# Patient Record
Sex: FEMALE | Race: WHITE
[De-identification: names, ages, dates, MRNs, and addresses within clinical notes are randomized per-mention and may not be internally consistent; named-entity substitution may affect disease eponyms.]

---

## 2017-06-19 ENCOUNTER — HOSPITAL ENCOUNTER (EMERGENCY)
Dept: HOSPITAL 83 - ED | Age: 82
Discharge: HOME | End: 2017-06-19
Payer: MEDICARE

## 2017-06-19 VITALS — HEIGHT: 61.97 IN | WEIGHT: 160 LBS | BODY MASS INDEX: 29.44 KG/M2

## 2017-06-19 DIAGNOSIS — M19.90: ICD-10-CM

## 2017-06-19 DIAGNOSIS — Z88.0: ICD-10-CM

## 2017-06-19 DIAGNOSIS — M19.041: Primary | ICD-10-CM

## 2017-06-19 DIAGNOSIS — Z90.49: ICD-10-CM

## 2022-05-02 DIAGNOSIS — I48.91 ATRIAL FIBRILLATION, UNSPECIFIED TYPE (HCC): ICD-10-CM

## 2022-05-02 PROBLEM — J44.9 COPD (CHRONIC OBSTRUCTIVE PULMONARY DISEASE) (HCC): Status: ACTIVE | Noted: 2022-05-02

## 2022-05-02 RX ORDER — POTASSIUM CHLORIDE 1.5 G/1.77G
20 POWDER, FOR SOLUTION ORAL DAILY
COMMUNITY
End: 2022-05-03

## 2022-05-03 ENCOUNTER — OFFICE VISIT (OUTPATIENT)
Dept: CARDIOLOGY CLINIC | Age: 87
End: 2022-05-03
Payer: MEDICARE

## 2022-05-03 VITALS
DIASTOLIC BLOOD PRESSURE: 68 MMHG | SYSTOLIC BLOOD PRESSURE: 124 MMHG | HEIGHT: 62 IN | HEART RATE: 76 BPM | WEIGHT: 121 LBS | BODY MASS INDEX: 22.26 KG/M2 | RESPIRATION RATE: 16 BRPM

## 2022-05-03 DIAGNOSIS — E78.5 DYSLIPIDEMIA: ICD-10-CM

## 2022-05-03 DIAGNOSIS — I50.22 CHRONIC SYSTOLIC CONGESTIVE HEART FAILURE (HCC): ICD-10-CM
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
## 2022-05-03 DIAGNOSIS — K21.00 GASTROESOPHAGEAL REFLUX DISEASE WITH ESOPHAGITIS WITHOUT HEMORRHAGE: ICD-10-CM

## 2022-05-03 DIAGNOSIS — I10 ESSENTIAL HYPERTENSION: ICD-10-CM

## 2022-05-03 DIAGNOSIS — J42 CHRONIC BRONCHITIS, UNSPECIFIED CHRONIC BRONCHITIS TYPE (HCC): ICD-10-CM

## 2022-05-03 DIAGNOSIS — I48.91 ATRIAL FIBRILLATION, UNSPECIFIED TYPE (HCC): Primary | ICD-10-CM

## 2022-05-03 PROCEDURE — 99214 OFFICE O/P EST MOD 30 MIN: CPT | Performed by: INTERNAL MEDICINE

## 2022-05-03 PROCEDURE — G8420 CALC BMI NORM PARAMETERS: HCPCS | Performed by: INTERNAL MEDICINE

## 2022-05-03 PROCEDURE — 93000 ELECTROCARDIOGRAM COMPLETE: CPT | Performed by: INTERNAL MEDICINE

## 2022-05-03 PROCEDURE — 4040F PNEUMOC VAC/ADMIN/RCVD: CPT | Performed by: INTERNAL MEDICINE

## 2022-05-03 PROCEDURE — 1123F ACP DISCUSS/DSCN MKR DOCD: CPT | Performed by: INTERNAL MEDICINE

## 2022-05-03 PROCEDURE — 3023F SPIROM DOC REV: CPT | Performed by: INTERNAL MEDICINE

## 2022-05-03 PROCEDURE — 1036F TOBACCO NON-USER: CPT | Performed by: INTERNAL MEDICINE

## 2022-05-03 PROCEDURE — 1090F PRES/ABSN URINE INCON ASSESS: CPT | Performed by: INTERNAL MEDICINE

## 2022-05-03 PROCEDURE — G8427 DOCREV CUR MEDS BY ELIG CLIN: HCPCS | Performed by: INTERNAL MEDICINE

## 2022-05-03 RX ORDER — ACETAMINOPHEN 325 MG/1
TABLET ORAL
COMMUNITY
Start: 2022-02-25

## 2022-05-03 RX ORDER — OMEPRAZOLE 40 MG/1
CAPSULE, DELAYED RELEASE ORAL
COMMUNITY
Start: 2022-03-10

## 2022-05-03 RX ORDER — METOPROLOL SUCCINATE 25 MG/1
25 TABLET, EXTENDED RELEASE ORAL DAILY
COMMUNITY

## 2022-05-03 RX ORDER — WITCH HAZEL 86 G/100ML
LIQUID TOPICAL
COMMUNITY
Start: 2022-01-25

## 2022-05-03 RX ORDER — RIVAROXABAN 15 MG/1
TABLET, FILM COATED ORAL
COMMUNITY
Start: 2022-04-22

## 2022-05-03 RX ORDER — DOCUSATE SODIUM 100 MG
CAPSULE ORAL
COMMUNITY
Start: 2022-03-10

## 2022-05-03 RX ORDER — HYDROCORTISONE ACETATE 25 MG
SUPPOSITORY, RECTAL RECTAL
COMMUNITY
Start: 2022-01-26

## 2022-05-03 RX ORDER — CHOLECALCIFEROL (VITAMIN D3) 125 MCG
CAPSULE ORAL
COMMUNITY
Start: 2022-03-10

## 2022-05-03 RX ORDER — GUAIFENESIN AND DEXTROMETHORPHAN HYDROBROMIDE 100; 10 MG/5ML; MG/5ML
5 SOLUTION ORAL EVERY 4 HOURS PRN
COMMUNITY

## 2022-05-03 RX ORDER — ALBUTEROL SULFATE 2.5 MG/3ML
2.5 SOLUTION RESPIRATORY (INHALATION) EVERY 6 HOURS PRN
COMMUNITY

## 2022-05-03 RX ORDER — CITALOPRAM 20 MG/1
TABLET ORAL
COMMUNITY
Start: 2022-03-10

## 2022-05-03 RX ORDER — POTASSIUM CHLORIDE 20 MEQ/1
TABLET, EXTENDED RELEASE ORAL
COMMUNITY
Start: 2022-03-10

## 2022-05-03 RX ORDER — CLONAZEPAM 0.5 MG/1
TABLET ORAL
COMMUNITY
Start: 2022-02-23

## 2022-05-03 RX ORDER — POLYVINYL ALCOHOL 14 MG/ML
1 SOLUTION/ DROPS OPHTHALMIC PRN
COMMUNITY

## 2022-05-03 NOTE — PROGRESS NOTES
Out Patient CARDIOLOGY FOLLOW UP    Name: Tete Don    Age: 80 y.o. Date of Service: 5/4/2022      Referring Physician: No admitting provider for patient encounter. Chief Complaint   Patient presents with    Follow-Up from Hospital     Cardiac check up, no cardiac complaints. History of Present Illness: 51-year-old female with history of atrial fibrillation, COPD, hypertension and GERD and memory difficulties who was hospitalized around March 23, 2022 for A. fib with RVR she was treated with Cardizem drip and discharged on Xarelto and beta-blocker. He underwent transthoracic echocardiogram on March 21, 2022 which revealed EF of 65 to 70% and moderate tricuspid regurgitation and mild mitral regurgitation. She presented for follow-up visit today from a nursing home with a caregiver and patient denies any symptoms. She will follow-up in 3 months. Patient had extensive fear of falling during the visit and requested the caregiver to take her back to the nursing home so that she does not fall. If patient develop fall or recurrent falls then we will have to reassess anticoagulation and perhaps discuss watchman device. Review of Systems:   Cardiac: As per HPI  General: Denies fever or chills  Pulmonary: As per HPI  HEENT: Denies runny nose  GI: No complaints  : No complaints  Endocrine: Denies night sweats  Musculoskeletal: No complaints  Skin: Dry skin  Neuro: No complaints  Psych: Denies depression    Past Medical History:  Past Medical History:   Diagnosis Date    Atrial fibrillation ([address]     COPD (chronic obstructive pulmonary disease) ([facility] [address]        Past Surgical History:  History reviewed. No pertinent surgical history.     Family History:  Family History   Problem Relation Age of Onset    Heart Disease Father        Social History:  Social History     Socioeconomic History    Marital status:      Spouse name: Not on file    Number of children: Not on file    Years of education: Not on file    Highest education level: Not on file   Occupational History    Not on file   Tobacco Use    Smoking status: Never Smoker    Smokeless tobacco: Never Used   Vaping Use    Vaping Use: Never used   Substance and Sexual Activity    Alcohol use: Not Currently    Drug use: Never    Sexual activity: Not on file   Other Topics Concern    Not on file   Social History Narrative    Not on file     Social Determinants of Health     Financial Resource Strain:     Difficulty of Paying Living Expenses: Not on file   Food Insecurity:     Worried About Running Out of Food in the Last Year: Not on file    Ivan of Food in the Last Year: Not on file   Transportation Needs:     Lack of Transportation (Medical): Not on file    Lack of Transportation (Non-Medical): Not on file   Physical Activity:     Days of Exercise per Week: Not on file    Minutes of Exercise per Session: Not on file   Stress:     Feeling of Stress : Not on file   Social Connections:     Frequency of Communication with Friends and Family: Not on file    Frequency of Social Gatherings with Friends and Family: Not on file    Attends [employer] Services: Not on file    Active Member of [address] [place] [address] or Organizations: Not on file    Attends Club or Organization Meetings: Not on file    Marital Status: Not on file   Intimate Partner Violence:     Fear of Current or Ex-Partner: Not on file    Emotionally Abused: Not on file    Physically Abused: Not on file    Sexually Abused: Not on file   Housing Stability:     Unable to Pay for Housing in the Last Year: Not on file    Number of [place] in the Last Year: Not on file    Unstable Housing in the Last Year: Not on file       Allergies: Allergies   Allergen Reactions    Pcn [Penicillins]        Home Medications:  Prior to Admission medications    Medication Sig Start Date End Date Taking?  Authorizing Provider   acetaminophen (TYLENOL) 325 MG tablet TAKE TWO TABLETS BY MOUTH TWICE DAILY EVERY TWELVE HOURS 2/25/22   Historical Provider, MD   citalopram (CELEXA) 20 MG tablet TAKE ONE TABLET BY MOUTH EVERY DAY 3/10/22   Historical Provider, MD   omeprazole (PRILOSEC) 40 MG delayed release capsule TAKE ONE CAPSULE BY MOUTH DAILY 3/10/22   Historical Provider, MD   Cholecalciferol (VITAMIN D3) 50 MCG (2000 UT) TABS TAKE ONE TABLET BY MOUTH EVERY DAY 3/10/22   Historical Provider, MD   XARELTO 15 MG TABS tablet TAKE ONE TABLET BY MOUTH EVERY DAY IN THE MORNING 4/22/22   Historical Provider, MD   potassium chloride (KLOR-CON M) 20 MEQ extended release tablet TAKE ONE TABLET BY MOUTH EVERY DAY 3/10/22   Historical Provider, MD   ANUCORT-HC 25 MG suppository unwrap and insert 1 suppository rectally twice daily for 5 days 1/26/22   Historical Provider, MD LOIS Carrillo [facility] NETWOR PADS use on hemorrhoids twice daily for 5 days 1/25/22   Historical Provider, MD   STOOL SOFTENER 100 MG capsule TAKE ONE CAPSULE BY MOUTH TWICE DAILY 3/10/22   Historical Provider, MD   clonazePAM (KLONOPIN) 0.5 MG tablet take 1/2 tablet by mouth twice daily 2/23/22   Historical Provider, MD   albuterol (PROVENTIL) (2.5 MG/3ML) 0.083% nebulizer solution Take 2.5 mg by nebulization every 6 hours as needed for Wheezing    Historical Provider, MD   metoprolol succinate (TOPROL XL) 25 MG extended release tablet Take 25 mg by mouth daily    Historical Provider, MD   polyvinyl alcohol (LIQUIFILM TEARS) 1.4 % ophthalmic solution 1 drop as needed    Historical Provider, MD   Dextromethorphan-guaiFENesin (ROBITUSSIN PEAK COLD DM)  MG/5ML SYRP Take 5 mLs by mouth every 4 hours as needed for Cough Indications: PRN Q4 HOURS FOR COUGHT    Historical Provider, MD       Current Medications:  Current Outpatient Medications   Medication Sig Dispense Refill    acetaminophen (TYLENOL) 325 MG tablet TAKE TWO TABLETS BY MOUTH TWICE DAILY EVERY TWELVE HOURS      citalopram (CELEXA) 20 MG tablet TAKE ONE TABLET BY MOUTH EVERY DAY      omeprazole (PRILOSEC) 40 MG delayed release capsule TAKE ONE CAPSULE BY MOUTH DAILY      Cholecalciferol (VITAMIN D3) 50 MCG (2000 UT) TABS TAKE ONE TABLET BY MOUTH EVERY DAY      XARELTO 15 MG TABS tablet TAKE ONE TABLET BY MOUTH EVERY DAY IN THE MORNING      potassium chloride (KLOR-CON M) 20 MEQ extended release tablet TAKE ONE TABLET BY MOUTH EVERY DAY      ANUCORT-HC 25 MG suppository unwrap and insert 1 suppository rectally twice daily for 5 days      LOIS Andrade use on hemorrhoids twice daily for 5 days      STOOL SOFTENER 100 MG capsule TAKE ONE CAPSULE BY MOUTH TWICE DAILY      clonazePAM (KLONOPIN) 0.5 MG tablet take 1/2 tablet by mouth twice daily      albuterol (PROVENTIL) (2.5 MG/3ML) 0.083% nebulizer solution Take 2.5 mg by nebulization every 6 hours as needed for Wheezing      metoprolol succinate (TOPROL XL) 25 MG extended release tablet Take 25 mg by mouth daily      polyvinyl alcohol (LIQUIFILM TEARS) 1.4 % ophthalmic solution 1 drop as needed      Dextromethorphan-guaiFENesin (ROBITUSSIN PEAK COLD DM)  MG/5ML SYRP Take 5 mLs by mouth every 4 hours as needed for Cough Indications: PRN Q4 HOURS FOR COUGHT       No current facility-administered medications for this visit. Physical Exam:  /68   Pulse 76   Resp 16   Ht 5' 2\" (1.575 m)   Wt 121 lb (54.9 kg)   BMI 22.13 kg/m²   Wt Readings from Last 3 Encounters:   05/03/22 121 lb (54.9 kg)       Appearance: Alert and oriented x3 not in acute distress.   Skin: Dry skin  Head: Atraumatic  Eyes: Intact extraocular muscles   ENMT: Mucous membranes are moist  Neck: Supple  Lungs: Clear to auscultation  Cardiac: Normal S1 and S2  Abdomen: Protuberant  Extremities: Intact range of motion  Neurologic: No focal neurological deficits  Peripheral Pulses: 2+ peripheral pulses    Intake/Output:  No intake or output data in the 24 hours ending 05/04/22 1546  [unfilled]    Laboratory Tests:  No results for input(s): NA, K, CL, CO2, BUN, CREATININE, GLUCOSE, CALCIUM in the last 72 hours. No results found for: MG  No results for input(s): ALKPHOS, ALT, AST, PROT, BILITOT, BILIDIR, LABALBU in the last 72 hours. No results for input(s): WBC, RBC, HGB, HCT, MCV, MCH, MCHC, RDW, PLT, MPV in the last 72 hours. No results found for: CKTOTAL, CKMB, CKMBINDEX, TROPONINI  No results for input(s): CKTOTAL, CKMB, CKMBINDEX, TROPHS in the last 72 hours. No results found for: INR, PROTIME  No results found for: TSHFT4, TSH  No results found for: LABA1C  No results found for: EAG  No results found for: CHOL  No results found for: TRIG  No results found for: HDL  No results found for: LDLCALC, LDLCHOLESTEROL  No results found for: LABVLDL, VLDL  No results found for: CHOLHDLRATIO  No results for input(s): PROBNP in the last 72 hours. Cardiac Tests:  EKG reviewed (EKG date: Sinus rhythm 76 bpm nonspecific ST-T wave changes):        Echocardiogram reviewed: Cardiogram in March 21, 2022 revealed EF of 65 to 70%, mild concentric left ventricular hypertrophy, normal right ventricular systolic function and size, severely dilated left atrium, mild mitral regurgitation, moderate tract regurgitation and right ventricular systolic pressure of 37 mmHg    Stress test reviewed:      Cardiac catheterization reviewed:     CXR reviewed: The ASCVD Risk score (Gila Pichardo et al., 2013) failed to calculate for the following reasons: The 2013 ASCVD risk score is only valid for ages 36 to 78    ASSESSMENT / PLAN:    1. Atrial fibrillation, unspecified type ([facility] [address]  Patient in sinus rhythm today at a rate of 76 bpm  Denies any symptoms  Continue on beta-blocker and Xarelto for anticoagulation    2. Chronic bronchitis, unspecified chronic bronchitis type ([facility] [address]  Follow up with your PCP  3. Essential hypertension  Blood pressure is stable  4.  Chronic systolic congestive heart failure (HCC)  Denies any symptoms and has no orthopnea or lower extremity edema  5. Dyslipidemia  Low-fat diet  6. Gastroesophageal reflux disease with esophagitis without hemorrhage  Follow-up with your PCP    7. Fear of falling  Patient had extensive fear of falling during the visit and requested the caregiver to take her back to the nursing home so that she does not fall  If patient develop fall or recurrent falls then we will have to reassess anticoagulation and perhaps discuss watchman device. FOLLOW-UP in 3 months      Thank you for allowing me to participate in your patient's care. Please feel free to contact me if you have any questions or concerns.     Dexter Roblero MD  [facility]) Cardiology

## 2022-05-04 PROBLEM — I10 ESSENTIAL HYPERTENSION: Status: ACTIVE | Noted: 2022-05-04

## 2022-05-04 PROBLEM — I50.22 CHRONIC SYSTOLIC CONGESTIVE HEART FAILURE (HCC): Status: ACTIVE | Noted: 2022-05-04

## 2022-05-04 PROBLEM — K21.00 GASTROESOPHAGEAL REFLUX DISEASE WITH ESOPHAGITIS WITHOUT HEMORRHAGE: Status: ACTIVE | Noted: 2022-05-04

## 2022-05-04 PROBLEM — E78.5 DYSLIPIDEMIA: Status: ACTIVE | Noted: 2022-05-04
